# Patient Record
Sex: FEMALE | Race: WHITE | NOT HISPANIC OR LATINO
[De-identification: names, ages, dates, MRNs, and addresses within clinical notes are randomized per-mention and may not be internally consistent; named-entity substitution may affect disease eponyms.]

---

## 2021-08-03 ENCOUNTER — NON-APPOINTMENT (OUTPATIENT)
Age: 66
End: 2021-08-03

## 2021-08-03 ENCOUNTER — APPOINTMENT (OUTPATIENT)
Dept: HEART AND VASCULAR | Facility: CLINIC | Age: 66
End: 2021-08-03
Payer: COMMERCIAL

## 2021-08-03 VITALS
TEMPERATURE: 97.6 F | HEART RATE: 84 BPM | SYSTOLIC BLOOD PRESSURE: 135 MMHG | HEIGHT: 64.5 IN | RESPIRATION RATE: 16 BRPM | OXYGEN SATURATION: 96 % | DIASTOLIC BLOOD PRESSURE: 72 MMHG | BODY MASS INDEX: 33.73 KG/M2 | WEIGHT: 200 LBS

## 2021-08-03 DIAGNOSIS — Z86.39 PERSONAL HISTORY OF OTHER ENDOCRINE, NUTRITIONAL AND METABOLIC DISEASE: ICD-10-CM

## 2021-08-03 DIAGNOSIS — Z78.9 OTHER SPECIFIED HEALTH STATUS: ICD-10-CM

## 2021-08-03 DIAGNOSIS — Z82.49 FAMILY HISTORY OF ISCHEMIC HEART DISEASE AND OTHER DISEASES OF THE CIRCULATORY SYSTEM: ICD-10-CM

## 2021-08-03 PROCEDURE — 99204 OFFICE O/P NEW MOD 45 MIN: CPT

## 2021-08-03 PROCEDURE — 93000 ELECTROCARDIOGRAM COMPLETE: CPT

## 2021-08-03 RX ORDER — LEVOTHYROXINE SODIUM 0.15 MG/1
150 TABLET ORAL
Refills: 0 | Status: ACTIVE | COMMUNITY

## 2021-08-03 RX ORDER — METFORMIN ER 500 MG 500 MG/1
500 TABLET ORAL DAILY
Refills: 0 | Status: ACTIVE | COMMUNITY

## 2021-08-03 NOTE — REVIEW OF SYSTEMS
[Negative] : Heme/Lymph [FreeTextEntry2] : except as above [FreeTextEntry5] : except as above [FreeTextEntry6] : except as above

## 2021-08-03 NOTE — REASON FOR VISIT
[Symptom and Test Evaluation] : symptom and test evaluation [FreeTextEntry1] : 66 year old F with history of DM (diagnosed at age 42), Graves disease s/p Thyroidectomy, Thyroid Cancer here for cardiac evaluation. Has been told she has had high cholesterol, however has been reluctant to starting statin. She has been getting occasional chest tightness. midsternal, on exertion. No palpitations. No orthopnea, PND, edema. \par \par 07/19/2021: A1C 8.2; HDL 58; ; ; total chol 226; Normal Cr. TSH 1140; Free T4 1.36; CMP WNL\par \par 08/03/2021 EKG NSR at 85 bpm Poor R wave progression. \par 07/14/2021 EKG NSR at 76 bpm

## 2021-08-03 NOTE — DISCUSSION/SUMMARY
[Possible Cardiac Ischemia (Intermd Prob)] : possible cardiac ischemia (intermediate probability) [Stress Echocardiogram] : stress echocardiogram [Diet Modification] : diet modification [Exercise] : exercise [de-identified] : 07/19/2021: A1C 8.2; HDL 58; ; ; total chol 226; Normal Cr. TSH 1140; Free T4 1.36; CMP WNL [de-identified] : Suggested Statin, will consider CCTA with calcium score after EXSE.  [FreeTextEntry3] : after testing completed

## 2021-08-18 ENCOUNTER — APPOINTMENT (OUTPATIENT)
Dept: HEART AND VASCULAR | Facility: CLINIC | Age: 66
End: 2021-08-18
Payer: COMMERCIAL

## 2021-08-18 VITALS
HEIGHT: 64.5 IN | HEART RATE: 84 BPM | DIASTOLIC BLOOD PRESSURE: 54 MMHG | OXYGEN SATURATION: 98 % | WEIGHT: 200 LBS | BODY MASS INDEX: 33.73 KG/M2 | SYSTOLIC BLOOD PRESSURE: 138 MMHG

## 2021-08-18 PROCEDURE — 93880 EXTRACRANIAL BILAT STUDY: CPT

## 2021-08-18 PROCEDURE — 93015 CV STRESS TEST SUPVJ I&R: CPT

## 2021-08-18 PROCEDURE — 93306 TTE W/DOPPLER COMPLETE: CPT

## 2021-08-19 DIAGNOSIS — R07.89 OTHER CHEST PAIN: ICD-10-CM

## 2021-08-19 DIAGNOSIS — I49.3 VENTRICULAR PREMATURE DEPOLARIZATION: ICD-10-CM

## 2021-08-26 ENCOUNTER — NON-APPOINTMENT (OUTPATIENT)
Age: 66
End: 2021-08-26

## 2021-08-27 ENCOUNTER — NON-APPOINTMENT (OUTPATIENT)
Age: 66
End: 2021-08-27

## 2021-09-10 ENCOUNTER — APPOINTMENT (OUTPATIENT)
Dept: HEART AND VASCULAR | Facility: CLINIC | Age: 66
End: 2021-09-10
Payer: COMMERCIAL

## 2021-09-10 DIAGNOSIS — Z00.00 ENCOUNTER FOR GENERAL ADULT MEDICAL EXAMINATION W/OUT ABNORMAL FINDINGS: ICD-10-CM

## 2021-09-10 PROCEDURE — 36415 COLL VENOUS BLD VENIPUNCTURE: CPT

## 2021-09-14 ENCOUNTER — RESULT REVIEW (OUTPATIENT)
Age: 66
End: 2021-09-14

## 2021-09-14 LAB
ANION GAP SERPL CALC-SCNC: 12 MMOL/L
BUN SERPL-MCNC: 18 MG/DL
CALCIUM SERPL-MCNC: 9.6 MG/DL
CHLORIDE SERPL-SCNC: 103 MMOL/L
CO2 SERPL-SCNC: 25 MMOL/L
CREAT SERPL-MCNC: 0.72 MG/DL
GLUCOSE SERPL-MCNC: 124 MG/DL
POTASSIUM SERPL-SCNC: 4.9 MMOL/L
SODIUM SERPL-SCNC: 140 MMOL/L

## 2021-09-16 ENCOUNTER — RESULT REVIEW (OUTPATIENT)
Age: 66
End: 2021-09-16

## 2021-09-16 DIAGNOSIS — R91.8 OTHER NONSPECIFIC ABNORMAL FINDING OF LUNG FIELD: ICD-10-CM

## 2021-09-23 ENCOUNTER — NON-APPOINTMENT (OUTPATIENT)
Age: 66
End: 2021-09-23

## 2021-09-23 RX ORDER — METOPROLOL SUCCINATE 100 MG/1
100 TABLET, EXTENDED RELEASE ORAL
Qty: 2 | Refills: 0 | Status: DISCONTINUED | COMMUNITY
Start: 2021-08-27 | End: 2021-09-23

## 2021-10-11 ENCOUNTER — RESULT REVIEW (OUTPATIENT)
Age: 66
End: 2021-10-11

## 2021-10-12 ENCOUNTER — APPOINTMENT (OUTPATIENT)
Dept: HEART AND VASCULAR | Facility: CLINIC | Age: 66
End: 2021-10-12
Payer: COMMERCIAL

## 2021-10-12 ENCOUNTER — NON-APPOINTMENT (OUTPATIENT)
Age: 66
End: 2021-10-12

## 2021-10-12 VITALS
HEART RATE: 80 BPM | RESPIRATION RATE: 16 BRPM | SYSTOLIC BLOOD PRESSURE: 117 MMHG | HEIGHT: 64.5 IN | DIASTOLIC BLOOD PRESSURE: 71 MMHG | OXYGEN SATURATION: 99 % | BODY MASS INDEX: 33.39 KG/M2 | WEIGHT: 198 LBS

## 2021-10-12 PROCEDURE — 99214 OFFICE O/P EST MOD 30 MIN: CPT

## 2021-10-12 NOTE — DISCUSSION/SUMMARY
[Diet Modification] : diet modification [Exercise] : exercise [Unlikely Cardiac Ischemia (Low Prob.)] : chest pain unlikely to represent cardiac ischemia (low probability) [de-identified] : 07/19/2021: A1C 8.2; HDL 58; ; ; total chol 226; Normal Cr. TSH 1140; Free T4 1.36; CMP WNL; CS 14; Moderate stenosis in proximal LAD; Nonobstructive disease elsewhere.  [de-identified] : Start Atorvastatin 40 mg PO daily [FreeTextEntry4] : will see Pulm re: lung findings and history of Asthma/Bronchitis/PNA history [FreeTextEntry3] : after testing completed

## 2021-10-12 NOTE — REASON FOR VISIT
[Symptom and Test Evaluation] : symptom and test evaluation [FreeTextEntry1] : 66 year old F with history of DM (diagnosed at age 42), Graves disease s/p Thyroidectomy, Thyroid Cancer here for followup. CT chest, CCTA and lab results reviewed with her. She denies palpitations, dyspnea, orthopnea, edema. cardiac evaluation. She is open to starting statin based on results. \par \par 10/2021 Chest CT - opacity resolved; Small left oblique intrafissural 1-2 mm oblong opacity unchanged, benign apperance, most c/w with an intrafissural LM and need not be further imaged. Calcified granuloma RANDOLPH, benign. Low density of the liver c/w hepatic steatosis.\par 09/2021 CT-FFR - LAD 0.86 (1); LCX 0.93 (1); RCA 0.72 (2)\par CCTA - CS 18; LM 14; LAD 1 LCX 0; RCA 3. Less than 25% stenosis of the LM; Moderate stenosis of the proximal LAD; Nonobstructive disease in remaining segments. \par \par EXSE [facility] protocol 6:13 min; No EKG changes; WMA on post exercise images. \par Resting echo: Mild cLVH; MAC, trace MR; LVEF 60%; Normal DF; Trace TR. RVSP 10 mm HG \par \par 07/19/2021: A1C 8.2; HDL 58; ; ; total chol 226; Normal Cr. TSH 1140; Free T4 1.36; CMP WNL\par \par 08/03/2021 EKG NSR at 85 bpm Poor R wave progression. \par 07/14/2021 EKG NSR at 76 bpm

## 2022-04-26 ENCOUNTER — APPOINTMENT (OUTPATIENT)
Dept: HEART AND VASCULAR | Facility: CLINIC | Age: 67
End: 2022-04-26
Payer: MEDICARE

## 2022-04-26 VITALS
RESPIRATION RATE: 16 BRPM | TEMPERATURE: 97.8 F | SYSTOLIC BLOOD PRESSURE: 110 MMHG | BODY MASS INDEX: 33.05 KG/M2 | OXYGEN SATURATION: 98 % | HEIGHT: 64.5 IN | HEART RATE: 84 BPM | DIASTOLIC BLOOD PRESSURE: 56 MMHG | WEIGHT: 196 LBS

## 2022-04-26 PROCEDURE — 99214 OFFICE O/P EST MOD 30 MIN: CPT

## 2022-04-26 RX ORDER — DULAGLUTIDE 1.5 MG/.5ML
1.5 INJECTION, SOLUTION SUBCUTANEOUS
Refills: 0 | Status: DISCONTINUED | COMMUNITY
End: 2022-04-26

## 2022-04-26 NOTE — REASON FOR VISIT
[Symptom and Test Evaluation] : symptom and test evaluation [FreeTextEntry1] : 66 year old F with history of DM (diagnosed at age 42), Graves disease s/p Thyroidectomy, Thyroid Cancer here for followup. She has not seen pulmonary as yet.  She denies palpitations, dyspnea, orthopnea, edema. cardiac evaluation. She has been on statin. \par \par 10/2021 Chest CT - opacity resolved; Small left oblique intrafissural 1-2 mm oblong opacity unchanged, benign apperance, most c/w with an intrafissural LM and need not be further imaged. Calcified granuloma RANDOLPH, benign. Low density of the liver c/w hepatic steatosis.\par 09/2021 CT-FFR - LAD 0.86 (1); LCX 0.93 (1); RCA 0.72 (2)\par CCTA - CS 18; LM 14; LAD 1 LCX 0; RCA 3. Less than 25% stenosis of the LM; Moderate stenosis of the proximal LAD; Nonobstructive disease in remaining segments. \par \par EXSE [facility] protocol 6:13 min; No EKG changes; WMA on post exercise images. \par Resting echo: Mild cLVH; MAC, trace MR; LVEF 60%; Normal DF; Trace TR. RVSP 10 mm HG \par \par 07/19/2021: A1C 8.2; HDL 58; ; ; total chol 226; Normal Cr. TSH 1140; Free T4 1.36; CMP WNL\par \par 08/03/2021 EKG NSR at 85 bpm Poor R wave progression. \par 07/14/2021 EKG NSR at 76 bpm

## 2022-04-26 NOTE — DISCUSSION/SUMMARY
[Unlikely Cardiac Ischemia (Low Prob.)] : chest pain unlikely to represent cardiac ischemia (low probability) [Diet Modification] : diet modification [Exercise] : exercise [___ Month(s)] : in [unfilled] month(s) [de-identified] : 07/19/2021: A1C 8.2; HDL 58; ; ; total chol 226; Normal Cr. TSH 1140; Free T4 1.36; CMP WNL; CS 14; Moderate stenosis in proximal LAD; Nonobstructive disease elsewhere.  [de-identified] : c/w Atorvastatin 40 mg PO daily; retrieve bloodwork done at Endocrin[facility] Office Dr. Brisa Li [FreeTextEntry4] : will see Pulm re: lung findings and history of Asthma/Bronchitis/PNA history

## 2022-09-07 ENCOUNTER — RX RENEWAL (OUTPATIENT)
Age: 67
End: 2022-09-07

## 2022-10-04 ENCOUNTER — APPOINTMENT (OUTPATIENT)
Dept: HEART AND VASCULAR | Facility: CLINIC | Age: 67
End: 2022-10-04
Payer: MEDICARE

## 2022-10-04 ENCOUNTER — NON-APPOINTMENT (OUTPATIENT)
Age: 67
End: 2022-10-04

## 2022-10-04 VITALS
RESPIRATION RATE: 16 BRPM | BODY MASS INDEX: 33.22 KG/M2 | OXYGEN SATURATION: 98 % | HEART RATE: 84 BPM | DIASTOLIC BLOOD PRESSURE: 60 MMHG | WEIGHT: 197 LBS | HEIGHT: 64.5 IN | TEMPERATURE: 97.8 F | SYSTOLIC BLOOD PRESSURE: 118 MMHG

## 2022-10-04 PROCEDURE — 93000 ELECTROCARDIOGRAM COMPLETE: CPT

## 2022-10-04 PROCEDURE — 99214 OFFICE O/P EST MOD 30 MIN: CPT | Mod: 25

## 2022-10-04 RX ORDER — DULAGLUTIDE 0.75 MG/.5ML
0.75 INJECTION, SOLUTION SUBCUTANEOUS
Refills: 0 | Status: DISCONTINUED | COMMUNITY
End: 2022-10-04

## 2022-10-04 RX ORDER — GLIMEPIRIDE 1 MG/1
1 TABLET ORAL TWICE DAILY
Refills: 0 | Status: ACTIVE | COMMUNITY

## 2022-10-04 NOTE — DISCUSSION/SUMMARY
[Unlikely Cardiac Ischemia (Low Prob.)] : chest pain unlikely to represent cardiac ischemia (low probability) [Diet Modification] : diet modification [Exercise] : exercise [___ Month(s)] : in [unfilled] month(s) [Lipids Test Panel] : a fasting lipid profile [de-identified] : 07/19/2021: A1C 8.2; HDL 58; ; ; total chol 226; Normal Cr. TSH 1140; Free T4 1.36; CMP WNL; CS 14; Moderate stenosis in proximal LAD; Nonobstructive disease elsewhere.  [de-identified] : c/w Atorvastatin 40 mg PO daily [FreeTextEntry4] : will see Pulm re: lung findings and history of Asthma/Bronchitis/PNA history [FreeTextEntry3] : (to come after coming back from [place] in June)

## 2022-10-04 NOTE — REASON FOR VISIT
[Symptom and Test Evaluation] : symptom and test evaluation [FreeTextEntry1] : 67 year old F with history of DM (diagnosed at age 42), Graves disease s/p Thyroidectomy, Thyroid Cancer here for followup.   She denies palpitations, dyspnea, orthopnea, edema. \par \par EKG today: SR at 85 bpm. No change from prior\par \par 10/2021 Chest CT - opacity resolved; Small left oblique intrafissural 1-2 mm oblong opacity unchanged, benign apperance, most c/w with an intrafissural LM and need not be further imaged. Calcified granuloma RANDOLPH, benign. Low density of the liver c/w hepatic steatosis.\par 09/2021 CT-FFR - LAD 0.86 (1); LCX 0.93 (1); RCA 0.72 (2)\par CCTA - CS 18; LM 14; LAD 1 LCX 0; RCA 3. Less than 25% stenosis of the LM; Moderate stenosis of the proximal LAD; Nonobstructive disease in remaining segments. \par \par EXSE [facility] protocol 6:13 min; No EKG changes; WMA on post exercise images. \par Resting echo: Mild cLVH; MAC, trace MR; LVEF 60%; Normal DF; Trace TR. RVSP 10 mm HG \par \par 09/29/2022 labs: A1C 8.8 and normal CMP\par 07/19/2021: A1C 8.2; HDL 58; ; ; total chol 226; Normal Cr. TSH 1140; Free T4 1.36; CMP WNL\par \par 08/03/2021 EKG NSR at 85 bpm Poor R wave progression. \par 07/14/2021 EKG NSR at 76 bpm

## 2022-10-05 ENCOUNTER — NON-APPOINTMENT (OUTPATIENT)
Age: 67
End: 2022-10-05

## 2022-10-05 LAB
CHOLEST SERPL-MCNC: 149 MG/DL
HDLC SERPL-MCNC: 54 MG/DL
LDLC SERPL CALC-MCNC: 73 MG/DL
NONHDLC SERPL-MCNC: 95 MG/DL
TRIGL SERPL-MCNC: 107 MG/DL

## 2023-08-01 ENCOUNTER — APPOINTMENT (OUTPATIENT)
Dept: HEART AND VASCULAR | Facility: CLINIC | Age: 68
End: 2023-08-01
Payer: MEDICARE

## 2023-08-01 VITALS
SYSTOLIC BLOOD PRESSURE: 130 MMHG | RESPIRATION RATE: 16 BRPM | OXYGEN SATURATION: 96 % | WEIGHT: 194 LBS | TEMPERATURE: 97.2 F | HEIGHT: 64.5 IN | DIASTOLIC BLOOD PRESSURE: 70 MMHG | HEART RATE: 76 BPM | BODY MASS INDEX: 32.72 KG/M2

## 2023-08-01 PROCEDURE — 99214 OFFICE O/P EST MOD 30 MIN: CPT

## 2023-08-01 NOTE — DISCUSSION/SUMMARY
[Unlikely Cardiac Ischemia (Low Prob.)] : chest pain unlikely to represent cardiac ischemia (low probability) [Diet Modification] : diet modification [Exercise] : exercise [___ Month(s)] : in [unfilled] month(s) [de-identified] : LDL at goal - LDL 45 per May 2023 labs. CS 14; Moderate stenosis in proximal LAD; Nonobstructive disease elsewhere.  [de-identified] : c/w Atorvastatin 40 mg PO daily

## 2023-08-01 NOTE — REASON FOR VISIT
[Symptom and Test Evaluation] : symptom and test evaluation [FreeTextEntry1] : 67 year old F with history of DM (diagnosed at age 42), Graves disease s/p Thyroidectomy, Thyroid Cancer here for followup.   She denies palpitations, dyspnea, orthopnea, edema.   EKG 10/2022: SR at 85 bpm. No change from prior  10/2021 Chest CT - opacity resolved; Small left oblique intrafissural 1-2 mm oblong opacity unchanged, benign apperance, most c/w with an intrafissural LM and need not be further imaged. Calcified granuloma RANDOLPH, benign. Low density of the liver c/w hepatic steatosis. 09/2021 CT-FFR - LAD 0.86 (1); LCX 0.93 (1); RCA 0.72 (2) CCTA - CS 18; LM 14; LAD 1 LCX 0; RCA 3. Less than 25% stenosis of the LM; Moderate stenosis of the proximal LAD; Nonobstructive disease in remaining segments.   EXSE [facility] protocol 6:13 min; No EKG changes; WMA on post exercise images.  Resting echo: Mild cLVH; MAC, trace MR; LVEF 60%; Normal DF; Trace TR. RVSP 10 mm HG   May 2023 labs: TSH 5.21, Vit D 29, Vit B12 495; Total chol 117; HDL 52; LDL 45; ; A1C 7.5 (DM regimen being managed by PCP and changed - per patient, Ozempic to be changed to Wegovy possibly). 09/29/2022 labs: A1C 8.8 and normal CMP 07/19/2021: A1C 8.2; HDL 58; ; ; total chol 226; Normal Cr. TSH 1140; Free T4 1.36; CMP WNL  08/03/2021 EKG NSR at 85 bpm Poor R wave progression.  07/14/2021 EKG NSR at 76 bpm

## 2023-10-25 RX ORDER — ATORVASTATIN CALCIUM 40 MG/1
40 TABLET, FILM COATED ORAL
Qty: 90 | Refills: 3 | Status: ACTIVE | COMMUNITY
Start: 2021-10-12 | End: 1900-01-01

## 2024-06-24 ENCOUNTER — APPOINTMENT (OUTPATIENT)
Dept: HEART AND VASCULAR | Facility: CLINIC | Age: 69
End: 2024-06-24
Payer: MEDICARE

## 2024-06-24 ENCOUNTER — NON-APPOINTMENT (OUTPATIENT)
Age: 69
End: 2024-06-24

## 2024-06-24 VITALS
WEIGHT: 194 LBS | HEART RATE: 77 BPM | SYSTOLIC BLOOD PRESSURE: 126 MMHG | BODY MASS INDEX: 33.12 KG/M2 | DIASTOLIC BLOOD PRESSURE: 64 MMHG | RESPIRATION RATE: 16 BRPM | TEMPERATURE: 97.3 F | OXYGEN SATURATION: 97 % | HEIGHT: 64 IN

## 2024-06-24 DIAGNOSIS — E11.9 TYPE 2 DIABETES MELLITUS W/OUT COMPLICATIONS: ICD-10-CM

## 2024-06-24 DIAGNOSIS — I25.10 ATHEROSCLEROTIC HEART DISEASE OF NATIVE CORONARY ARTERY W/OUT ANGINA PECTORIS: ICD-10-CM

## 2024-06-24 DIAGNOSIS — R06.09 OTHER FORMS OF DYSPNEA: ICD-10-CM

## 2024-06-24 DIAGNOSIS — Z86.39 PERSONAL HISTORY OF OTHER ENDOCRINE, NUTRITIONAL AND METABOLIC DISEASE: ICD-10-CM

## 2024-06-24 PROCEDURE — 93000 ELECTROCARDIOGRAM COMPLETE: CPT

## 2024-06-24 PROCEDURE — 99214 OFFICE O/P EST MOD 30 MIN: CPT

## 2024-06-24 PROCEDURE — G2211 COMPLEX E/M VISIT ADD ON: CPT

## 2024-06-24 RX ORDER — ORAL SEMAGLUTIDE 7 MG/1
7 TABLET ORAL DAILY
Refills: 0 | Status: DISCONTINUED | COMMUNITY
End: 2024-06-24

## 2024-09-04 ENCOUNTER — NON-APPOINTMENT (OUTPATIENT)
Age: 69
End: 2024-09-04

## 2024-09-05 ENCOUNTER — APPOINTMENT (OUTPATIENT)
Dept: HEART AND VASCULAR | Facility: CLINIC | Age: 69
End: 2024-09-05
Payer: MEDICARE

## 2024-09-05 VITALS
BODY MASS INDEX: 33.12 KG/M2 | HEIGHT: 64 IN | RESPIRATION RATE: 16 BRPM | TEMPERATURE: 97.2 F | HEART RATE: 76 BPM | DIASTOLIC BLOOD PRESSURE: 58 MMHG | WEIGHT: 194 LBS | OXYGEN SATURATION: 98 % | SYSTOLIC BLOOD PRESSURE: 122 MMHG

## 2024-09-05 DIAGNOSIS — R06.09 OTHER FORMS OF DYSPNEA: ICD-10-CM

## 2024-09-05 DIAGNOSIS — I25.10 ATHEROSCLEROTIC HEART DISEASE OF NATIVE CORONARY ARTERY W/OUT ANGINA PECTORIS: ICD-10-CM

## 2024-09-05 DIAGNOSIS — Z86.39 PERSONAL HISTORY OF OTHER ENDOCRINE, NUTRITIONAL AND METABOLIC DISEASE: ICD-10-CM

## 2024-09-05 PROCEDURE — G2211 COMPLEX E/M VISIT ADD ON: CPT

## 2024-09-05 PROCEDURE — 93351 STRESS TTE COMPLETE: CPT

## 2024-09-05 PROCEDURE — 93320 DOPPLER ECHO COMPLETE: CPT

## 2024-09-05 PROCEDURE — 93325 DOPPLER ECHO COLOR FLOW MAPG: CPT

## 2024-09-05 PROCEDURE — 99214 OFFICE O/P EST MOD 30 MIN: CPT

## 2024-09-05 NOTE — PHYSICAL EXAM
[Well Developed] : well developed [Well Nourished] : well nourished [No Acute Distress] : no acute distress [Normal Conjunctiva] : normal conjunctiva [Normal Venous Pressure] : normal venous pressure [No Carotid Bruit] : no carotid bruit [Normal S1, S2] : normal S1, S2 [No Murmur] : no murmur [No Rub] : no rub [No Gallop] : no gallop [Clear Lung Fields] : clear lung fields [Good Air Entry] : good air entry [No Respiratory Distress] : no respiratory distress  [Soft] : abdomen soft [Non Tender] : non-tender [No Masses/organomegaly] : no masses/organomegaly [Normal Bowel Sounds] : normal bowel sounds [Gait - Sufficient for Exercise Testing] : gait - sufficient for exercise testing [No Edema] : no edema [No Cyanosis] : no cyanosis [No Clubbing] : no clubbing [No Rash] : no rash [No Skin Lesions] : no skin lesions [Moves all extremities] : moves all extremities [No Focal Deficits] : no focal deficits [Normal Speech] : normal speech [Alert and Oriented] : alert and oriented [Normal memory] : normal memory

## 2024-09-05 NOTE — DISCUSSION/SUMMARY
[FreeTextEntry1] : stable exam CAD/sob- stable, normal stress echo results discussed, continue risk modification including increased  exercise Lipids- stable, continue statin